# Patient Record
Sex: FEMALE | Race: WHITE | Employment: UNEMPLOYED | URBAN - METROPOLITAN AREA
[De-identification: names, ages, dates, MRNs, and addresses within clinical notes are randomized per-mention and may not be internally consistent; named-entity substitution may affect disease eponyms.]

---

## 2021-07-15 ENCOUNTER — HOSPITAL ENCOUNTER (EMERGENCY)
Age: 61
Discharge: HOME | End: 2021-07-16
Attending: EMERGENCY MEDICINE
Payer: COMMERCIAL

## 2021-07-15 ENCOUNTER — APPOINTMENT (OUTPATIENT)
Dept: GENERAL RADIOLOGY | Age: 61
End: 2021-07-15
Payer: COMMERCIAL

## 2021-07-15 DIAGNOSIS — R06.02 SHORTNESS OF BREATH: Primary | ICD-10-CM

## 2021-07-15 LAB
ANION GAP SERPL CALCULATED.3IONS-SCNC: 18 MMOL/L (ref 9–17)
BUN BLDV-MCNC: 14 MG/DL (ref 8–23)
BUN/CREAT BLD: ABNORMAL (ref 9–20)
CALCIUM SERPL-MCNC: 8.9 MG/DL (ref 8.6–10.4)
CHLORIDE BLD-SCNC: 103 MMOL/L (ref 98–107)
CO2: 19 MMOL/L (ref 20–31)
CREAT SERPL-MCNC: 0.72 MG/DL (ref 0.5–0.9)
GFR AFRICAN AMERICAN: >60 ML/MIN
GFR NON-AFRICAN AMERICAN: >60 ML/MIN
GFR SERPL CREATININE-BSD FRML MDRD: ABNORMAL ML/MIN/{1.73_M2}
GFR SERPL CREATININE-BSD FRML MDRD: ABNORMAL ML/MIN/{1.73_M2}
GLUCOSE BLD-MCNC: 139 MG/DL (ref 70–99)
POTASSIUM SERPL-SCNC: 3.7 MMOL/L (ref 3.7–5.3)
SODIUM BLD-SCNC: 140 MMOL/L (ref 135–144)

## 2021-07-15 PROCEDURE — 84443 ASSAY THYROID STIM HORMONE: CPT

## 2021-07-15 PROCEDURE — 85025 COMPLETE CBC W/AUTO DIFF WBC: CPT

## 2021-07-15 PROCEDURE — 80048 BASIC METABOLIC PNL TOTAL CA: CPT

## 2021-07-15 PROCEDURE — 93005 ELECTROCARDIOGRAM TRACING: CPT | Performed by: STUDENT IN AN ORGANIZED HEALTH CARE EDUCATION/TRAINING PROGRAM

## 2021-07-15 PROCEDURE — 84484 ASSAY OF TROPONIN QUANT: CPT

## 2021-07-15 PROCEDURE — 99285 EMERGENCY DEPT VISIT HI MDM: CPT

## 2021-07-15 PROCEDURE — 71046 X-RAY EXAM CHEST 2 VIEWS: CPT

## 2021-07-16 VITALS
OXYGEN SATURATION: 95 % | TEMPERATURE: 97.7 F | WEIGHT: 145 LBS | RESPIRATION RATE: 16 BRPM | HEART RATE: 88 BPM | SYSTOLIC BLOOD PRESSURE: 106 MMHG | BODY MASS INDEX: 26.52 KG/M2 | DIASTOLIC BLOOD PRESSURE: 71 MMHG

## 2021-07-16 VITALS
OXYGEN SATURATION: 98 % | HEART RATE: 88 BPM | SYSTOLIC BLOOD PRESSURE: 129 MMHG | DIASTOLIC BLOOD PRESSURE: 88 MMHG | TEMPERATURE: 98.2 F | RESPIRATION RATE: 16 BRPM

## 2021-07-16 DIAGNOSIS — S61.219A LACERATION OF FINGER, FOREIGN BODY PRESENCE UNSPECIFIED, NAIL DAMAGE STATUS UNSPECIFIED, UNSPECIFIED FINGER, UNSPECIFIED LATERALITY, INITIAL ENCOUNTER: Primary | ICD-10-CM

## 2021-07-16 LAB
ABSOLUTE EOS #: 0.49 K/UL (ref 0–0.44)
ABSOLUTE IMMATURE GRANULOCYTE: 0 K/UL (ref 0–0.3)
ABSOLUTE LYMPH #: 4.12 K/UL (ref 1.1–3.7)
ABSOLUTE MONO #: 0.49 K/UL (ref 0.1–1.2)
BASOPHILS # BLD: 0 % (ref 0–2)
BASOPHILS ABSOLUTE: 0 K/UL (ref 0–0.2)
DIFFERENTIAL TYPE: ABNORMAL
EOSINOPHILS RELATIVE PERCENT: 5 % (ref 1–4)
HCT VFR BLD CALC: 39.3 % (ref 36.3–47.1)
HEMOGLOBIN: 12.7 G/DL (ref 11.9–15.1)
IMMATURE GRANULOCYTES: 0 %
LYMPHOCYTES # BLD: 42 % (ref 24–43)
MCH RBC QN AUTO: 28.5 PG (ref 25.2–33.5)
MCHC RBC AUTO-ENTMCNC: 32.3 G/DL (ref 28.4–34.8)
MCV RBC AUTO: 88.3 FL (ref 82.6–102.9)
MONOCYTES # BLD: 5 % (ref 3–12)
MORPHOLOGY: NORMAL
NRBC AUTOMATED: 0 PER 100 WBC
PDW BLD-RTO: 14.1 % (ref 11.8–14.4)
PLATELET # BLD: 265 K/UL (ref 138–453)
PLATELET ESTIMATE: ABNORMAL
PMV BLD AUTO: 9.5 FL (ref 8.1–13.5)
RBC # BLD: 4.45 M/UL (ref 3.95–5.11)
RBC # BLD: ABNORMAL 10*6/UL
SEG NEUTROPHILS: 48 % (ref 36–65)
SEGMENTED NEUTROPHILS ABSOLUTE COUNT: 4.7 K/UL (ref 1.5–8.1)
TROPONIN INTERP: NORMAL
TROPONIN INTERP: NORMAL
TROPONIN T: NORMAL NG/ML
TROPONIN T: NORMAL NG/ML
TROPONIN, HIGH SENSITIVITY: <6 NG/L (ref 0–14)
TROPONIN, HIGH SENSITIVITY: <6 NG/L (ref 0–14)
TSH SERPL DL<=0.05 MIU/L-ACNC: 1.35 MIU/L (ref 0.3–5)
WBC # BLD: 9.8 K/UL (ref 3.5–11.3)
WBC # BLD: ABNORMAL 10*3/UL

## 2021-07-16 PROCEDURE — 12001 RPR S/N/AX/GEN/TRNK 2.5CM/<: CPT

## 2021-07-16 PROCEDURE — 84484 ASSAY OF TROPONIN QUANT: CPT

## 2021-07-16 PROCEDURE — 99282 EMERGENCY DEPT VISIT SF MDM: CPT

## 2021-07-16 ASSESSMENT — ENCOUNTER SYMPTOMS
RECTAL PAIN: 0
CHEST TIGHTNESS: 0
WHEEZING: 0
STRIDOR: 0
COUGH: 0
DIARRHEA: 0
ABDOMINAL DISTENTION: 0
TROUBLE SWALLOWING: 0
SHORTNESS OF BREATH: 1
VOMITING: 0
ABDOMINAL PAIN: 0
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
## 2021-07-16 NOTE — ED PROVIDER NOTES
[facility]  ED  Emergency Department Encounter  EmergencyMedicine Resident     Pt Herminia Poole  MRN: [id number]  [place] 1960  Date of evaluation: 7/16/21  PCP:  Fransico Oglesby    This patient was evaluated in the Emergency Department for symptoms described in the history of present illness. The patient was evaluated in the context of the global COVID-19 pandemic, which necessitated consideration that the patient might be at risk for infection with the SARS-CoV-2 virus that causes COVID-19. Institutional protocols and algorithms that pertain to the evaluation of patients at risk for COVID-19 are in a state of rapid change based on information released by regulatory bodies including the CDC and federal and state organizations. These policies and algorithms were followed during the patient's care in the ED. CHIEF COMPLAINT       Chief Complaint   Patient presents with    Panic Attack    Headache       HISTORY OF PRESENT ILLNESS  (Location/Symptom, Timing/Onset, Context/Setting, Quality, Duration, Modifying Factors, Severity.)      Genet Maria is a 64 y.o. female who presents with shortness of breath. Patient states that she was walking up and down the stairs and felt the shortness of breath come on gradually. Patient states that she wanted to: 1.4 whatever reason her  and wanted to. Patient states that she fell asleep and woke up and continued to have the shortness of breath symptoms, no associated chest pain dizziness drowsiness vomiting or other constitutional symptoms. Of note patient is a cigarette smoker who smokes \"many packs a day\". Upon evaluation today patient did not have acute chest pain, she was afebrile and did not have any other constitutional symptoms. Patient states that she was also feeling little better than when she did at home. PAST MEDICAL / SURGICAL / SOCIAL / FAMILY HISTORY      has no past medical history on file.   Patient states that she has a history of hypothyroidism. has a past surgical history that includes Tubal ligation. Social History     Socioeconomic History    Marital status:      Spouse name: Not on file    Number of children: Not on file    Years of education: Not on file    Highest education level: Not on file   Occupational History    Not on file   Tobacco Use    Smoking status: Current Every Day Smoker     Packs/day: 0.25     Years: 41.00     Pack years: 10.25     Types: Cigarettes    Smokeless tobacco: Never Used   Substance and Sexual Activity    Alcohol use: No     Comment: previous heavy ETOH use, stopped around Christmas 2012    Drug use: No    Sexual activity: Not on file   Other Topics Concern    Not on file   Social History Narrative    Not on file     Social Determinants of Health     Financial Resource Strain:     Difficulty of Paying Living Expenses:    Food Insecurity:     Worried About [address] in the Last Year:     [address] [employer] in the Last Year:    Transportation Needs:     Lack of Transportation (Medical):  Lack of Transportation (Non-Medical):    Physical Activity:     Days of Exercise per Week:     Minutes of Exercise per Session:    Stress:     Feeling of Stress :    Social Connections:     Frequency of Communication with Friends and Family:     Frequency of Social Gatherings with Friends and Family:     Attends Orthodoxy Services:     Active Member of Clubs or Organizations:     Attends Club or Organization Meetings:     Marital Status:    Intimate Partner Violence:     Fear of Current or Ex-Partner:     Emotionally Abused:     Physically Abused:     Sexually Abused:        History reviewed. No pertinent family history. Allergies:  Patient has no known allergies. Home Medications:  Prior to Admission medications    Medication Sig Start Date End Date Taking?  Authorizing Provider   HYDROcodone-acetaminophen (NORCO) 5-325 MG per tablet Take 1 tablet by mouth every 6 hours as needed for Pain    Historical Provider, MD   gabapentin (NEURONTIN) 100 MG capsule Take 1 capsule by mouth 3 times daily 5/10/15   Dee Dee Kaplan MD   guaiFENesin (MUCINEX) 600 MG SR tablet Take 1 tablet by mouth 2 times daily. 11/28/14   Philippa Holstein, MD       REVIEW OF SYSTEMS    (2-9 systems for level 4, 10 or more for level 5)      Review of Systems   Constitutional: Negative for chills, diaphoresis, fatigue and fever. HENT: Negative for trouble swallowing. Eyes: Negative for visual disturbance. Respiratory: Positive for shortness of breath. Negative for cough, chest tightness, wheezing and stridor. Cardiovascular: Negative for chest pain and leg swelling. Gastrointestinal: Negative for abdominal distention, abdominal pain, diarrhea, rectal pain and vomiting. Genitourinary: Negative for difficulty urinating, vaginal discharge and vaginal pain. Musculoskeletal: Negative for arthralgias. Neurological: Positive for headaches. Negative for dizziness, facial asymmetry, light-headedness and numbness. PHYSICAL EXAM   (up to 7 for level 4, 8 or more for level 5)      INITIAL VITALS:   /71   Pulse 88   Temp 97.7 °F (36.5 °C)   Resp 16   Wt 145 lb (65.8 kg)   SpO2 95%   BMI 26.52 kg/m²     Physical Exam  Constitutional:       Appearance: Normal appearance. HENT:      Head: Normocephalic and atraumatic. Nose: Nose normal.      Mouth/Throat:      Mouth: Mucous membranes are moist.   Eyes:      Extraocular Movements: Extraocular movements intact. Conjunctiva/sclera: Conjunctivae normal.      Pupils: Pupils are equal, round, and reactive to light. Cardiovascular:      Rate and Rhythm: Regular rhythm. Pulses: Normal pulses. Heart sounds: Normal heart sounds. Pulmonary:      Effort: Pulmonary effort is normal.      Breath sounds: Normal breath sounds. Abdominal:      General: Abdomen is flat. Skin:     General: Skin is warm. Neurological:      General: No focal deficit present. Mental Status: She is alert. DIFFERENTIAL  DIAGNOSIS     PLAN (LABS / IMAGING / EKG):  Orders Placed This Encounter   Procedures    XR CHEST (2 VW)    CBC Auto Differential    Basic Metabolic Panel w/ Reflex to MG    Troponin    TSH with Reflex    EKG 12 Lead       MEDICATIONS ORDERED:  No orders of the defined types were placed in this encounter.       DDX: Hypothyroidism, MI, PE, pneumothorax, dissection    DIAGNOSTIC RESULTS / EMERGENCY DEPARTMENT COURSE / MDM   LAB RESULTS:  Results for orders placed or performed during the hospital encounter of 07/15/21   CBC Auto Differential   Result Value Ref Range    WBC 9.8 3.5 - 11.3 k/uL    RBC 4.45 3.95 - 5.11 m/uL    Hemoglobin 12.7 11.9 - 15.1 g/dL    Hematocrit 39.3 36.3 - 47.1 %    MCV 88.3 82.6 - 102.9 fL    MCH 28.5 25.2 - 33.5 pg    MCHC 32.3 28.4 - 34.8 g/dL    RDW 14.1 11.8 - 14.4 %    Platelets 445 123 - 324 k/uL    MPV 9.5 8.1 - 13.5 fL    NRBC Automated 0.0 0.0 per 100 WBC    Differential Type NOT REPORTED     WBC Morphology NOT REPORTED     RBC Morphology NOT REPORTED     Platelet Estimate NOT REPORTED     Immature Granulocytes 0 0 %    Seg Neutrophils 48 36 - 65 %    Lymphocytes 42 24 - 43 %    Monocytes 5 3 - 12 %    Eosinophils % 5 (H) 1 - 4 %    Basophils 0 0 - 2 %    Absolute Immature Granulocyte 0.00 0.00 - 0.30 k/uL    Segs Absolute 4.70 1.50 - 8.10 k/uL    Absolute Lymph # 4.12 (H) 1.10 - 3.70 k/uL    Absolute Mono # 0.49 0.10 - 1.20 k/uL    Absolute Eos # 0.49 (H) 0.00 - 0.44 k/uL    Basophils Absolute 0.00 0.00 - 0.20 k/uL    Morphology Normal    Basic Metabolic Panel w/ Reflex to MG   Result Value Ref Range    Glucose 139 (H) 70 - 99 mg/dL    BUN 14 8 - 23 mg/dL    CREATININE 0.72 0.50 - 0.90 mg/dL    Bun/Cre Ratio NOT REPORTED 9 - 20    Calcium 8.9 8.6 - 10.4 mg/dL    Sodium 140 135 - 144 mmol/L    Potassium 3.7 3.7 - 5.3 mmol/L    Chloride 103 98 - 107 mmol/L    CO2 19 (L) 20 - 31 mmol/L    Anion Gap 18 (H) 9 - 17 mmol/L    GFR Non-African American >60 >60 mL/min    GFR African American >60 >60 mL/min    GFR Comment          GFR Staging NOT REPORTED    Troponin   Result Value Ref Range    Troponin, High Sensitivity <6 0 - 14 ng/L    Troponin T NOT REPORTED <0.03 ng/mL    Troponin Interp NOT REPORTED    Troponin   Result Value Ref Range    Troponin, High Sensitivity <6 0 - 14 ng/L    Troponin T NOT REPORTED <0.03 ng/mL    Troponin Interp NOT REPORTED    TSH with Reflex   Result Value Ref Range    TSH 1.35 0.30 - 5.00 mIU/L       IMPRESSION: 80-year-old female with history of high both thyroidism presenting with acute shortness of breath. Patient was stable satting well on room air did not require oxygen and was ambulating with a saturation at 97%. Full work-up was negative for acute abnormalities. Patient was given return precautions and discharged home. RADIOLOGY:  XR CHEST (2 VW)    Result Date: 7/16/2021  No acute cardiopulmonary abnormality. EKG  EKG Interpretation    Interpreted by emergency department physician    Rhythm: normal sinus   Rate: normal  Axis: normal  Ectopy: none and premature ventricular contractions (unifocal)  Conduction: normal  ST Segments: normal  T Waves: no acute change  Q Waves: none    Clinical Impression: no acute changes    Lexi Thomason DO      All EKG's are interpreted by the Emergency Department Physician who either signs or Co-signs this chart in the absence of a cardiologist.      FINAL IMPRESSION      1.  Shortness of breath          DISPOSITION / PLAN     DISPOSITION discharged      PATIENT REFERRED TO:  David Youngblood [address] [id number]  [phone]    Schedule an appointment as soon as possible for a visit in 1 week  As needed, If symptoms worsen      DISCHARGE MEDICATIONS:  Discharge Medication List as of 7/16/2021  3:14 AM          Lexi Thomason DO  Emergency Medicine Resident    (Please note that portions of thisnote were completed with a voice recognition program.  Efforts were made to edit the dictations but occasionally words are mis-transcribed.)        Jazmyn Lindquist DO  Resident  07/16/21 1355

## 2021-07-16 NOTE — ED NOTES
Bed: 04  Expected date: 7/15/21  Expected time: 11:01 PM  Means of arrival:   Comments:   [id number] [facility]  07/15/21 8760

## 2021-07-16 NOTE — ED NOTES
Spoke with lab who stated Trop results got overlooked and she will run and result that now.       Maria Luisa Fajardo RN  07/16/21 7164

## 2021-07-16 NOTE — ED PROVIDER NOTES
10.25     Types: Cigarettes    Smokeless tobacco: Never Used   Substance and Sexual Activity    Alcohol use: No     Comment: previous heavy ETOH use, stopped around Christmas 2012    Drug use: No    Sexual activity: Not on file   Other Topics Concern    Not on file   Social History Narrative    Not on file     Social Determinants of Health     Financial Resource Strain:     Difficulty of Paying Living Expenses:    Food Insecurity:     Worried About Running Out of Food in the Last Year:     [address] in the Last Year:    Transportation Needs:     Lack of Transportation (Medical):  Lack of Transportation (Non-Medical):    Physical Activity:     Days of Exercise per Week:     Minutes of Exercise per Session:    Stress:     Feeling of Stress :    Social Connections:     Frequency of Communication with Friends and Family:     Frequency of Social Gatherings with Friends and Family:     Attends Caodaism Services:     Active Member of Clubs or Organizations:     Attends Club or Organization Meetings:     Marital Status:    Intimate Partner Violence:     Fear of Current or Ex-Partner:     Emotionally Abused:     Physically Abused:     Sexually Abused:        History reviewed. No pertinent family history. Allergies:  Patient has no known allergies. Home Medications:  Prior to Admission medications    Medication Sig Start Date End Date Taking? Authorizing Provider   HYDROcodone-acetaminophen (NORCO) 5-325 MG per tablet Take 1 tablet by mouth every 6 hours as needed for Pain    Historical Provider, MD   gabapentin (NEURONTIN) 100 MG capsule Take 1 capsule by mouth 3 times daily 5/10/15   Katie Kaplan MD   guaiFENesin (MUCINEX) 600 MG SR tablet Take 1 tablet by mouth 2 times daily. 11/28/14   Myrna Nair MD       REVIEW OF SYSTEMS    (2-9 systems for level 4, 10 or more for level 5)      Review of Systems   Skin: Positive for wound.        PHYSICAL EXAM   (up to 7 for level 4, 8 or more for level 5)      INITIAL VITALS:   /88   Pulse 88   Temp 98.2 °F (36.8 °C) (Oral)   Resp 16   SpO2 98%     Physical Exam  Skin:     General: Skin is warm. Capillary Refill: Capillary refill takes less than 2 seconds. Findings: Erythema, lesion and wound present. No bruising. Comments: Multiple superficial lacerations present, not exposing subcutaneous tissue. 2 cm cut to the dorsal aspect of her right hand immediately proximal to her second digit. Additional small abrasions present between the MCP and PIP of the third and fourth digits. No obvious bony deformities, no point tenderness, range of motion is unimpaired, able to actively range all digits in flexion and extension. Cap refill less than 2 seconds radial pulse adequate present. DIFFERENTIAL  DIAGNOSIS     PLAN (LABS / IMAGING / EKG):  No orders of the defined types were placed in this encounter. MEDICATIONS ORDERED:  No orders of the defined types were placed in this encounter. DDX: Laceration, mild    DIAGNOSTIC RESULTS / EMERGENCY DEPARTMENT COURSE / MDM   LAB RESULTS:  No results found for this visit on 07/16/21. IMPRESSION: Patient sustained minor cuts to her hand. Not actively bleeding. Band aid was applied to cuts after being cleaned with normal saline and alcohol swabs. Complications. Lac Repair    Date/Time: 7/16/2021 6:10 AM  Performed by: Heath Thompson DO  Authorized by: Lyle Celaya MD     Consent:     Consent obtained:  Verbal    Consent given by:  Patient    Risks discussed:  Infection and pain    Alternatives discussed:  No treatment  Anesthesia (see MAR for exact dosages):      Anesthesia method:  None  Laceration details:     Location:  Hand    Hand location:  R hand, dorsum  Repair type:     Repair type:  Simple  Exploration:     Hemostasis achieved with:  Direct pressure    Wound extent: no muscle damage noted and no vascular damage noted      Contaminated: no Treatment:     Area cleansed with:  Soap and water    Amount of cleaning:  Standard    Irrigation solution:  Sterile saline  Skin repair:     Repair method:  Tissue adhesive  Approximation:     Approximation:  Loose  Post-procedure details:     Dressing:  Adhesive bandage    Patient tolerance of procedure: Tolerated well, no immediate complications  Comments:      Dermabond and Band-Aid was applied to the wound, no issues              FINAL IMPRESSION      1.  Laceration of finger, foreign body presence unspecified, nail damage status unspecified, unspecified finger, unspecified laterality, initial encounter          DISPOSITION / PLAN     DISPOSITION Decision To Discharge 07/16/2021 04:31:45 AM      PATIENT REFERRED TO:  [address]  [phone]    Schedule an appointment as soon as possible for a visit today  For wound re-check      DISCHARGE MEDICATIONS:  Discharge Medication List as of 7/16/2021  4:38 AM          Gustavo Cuevas DO  Emergency Medicine Resident    (Please note that portions of thisnote were completed with a voice recognition program.  Efforts were made to edit the dictations but occasionally words are mis-transcribed.)        Diana Howe DO  Resident  07/16/21 Morena Rowland,   Resident  07/21/21 0005

## 2021-07-17 LAB
EKG ATRIAL RATE: 93 BPM
EKG P AXIS: 63 DEGREES
EKG P-R INTERVAL: 160 MS
EKG Q-T INTERVAL: 360 MS
EKG QRS DURATION: 82 MS
EKG QTC CALCULATION (BAZETT): 447 MS
EKG R AXIS: 65 DEGREES
EKG T AXIS: 38 DEGREES
EKG VENTRICULAR RATE: 93 BPM

## 2021-07-21 NOTE — ED PROVIDER NOTES
Critical Care: None     Liz Walker MD  Attending Emergency Physician         Liz Walker MD  07/20/21 3042

## 2021-07-21 NOTE — ED PROVIDER NOTES
[address] [facility]   Emergency Department  Faculty Attestation       I performed a history and physical examination of the patient and discussed management with the resident. I reviewed the residents note and agree with the documented findings including all diagnostic interpretations and plan of care. Any areas of disagreement are noted on the chart. I was personally present for the key portions of any procedures. I have documented in the chart those procedures where I was not present during the key portions. I have reviewed the emergency nurses triage note. I agree with the chief complaint, past medical history, past surgical history, allergies, medications, social and family history as documented unless otherwise noted below. Documentation of the HPI, Physical Exam and Medical Decision Making performed by [email or web address] is based on my personal performance of the HPI, PE and MDM. For Physician Assistant/ Nurse Practitioner cases/documentation I have personally evaluated this patient and have completed at least one if not all key elements of the E/M (history, physical exam, and MDM). Additional findings are as noted. Pertinent Comments     Primary Care Physician: Carlee Malave      ED Triage Vitals [07/15/21 2314]   BP Temp Temp src Pulse Resp SpO2 Height Weight   130/72 97.7 °F (36.5 °C) -- 96 18 90 % -- 145 lb (65.8 kg)        History/Physical: This is a 64 y.o. female who presents to the Emergency Department with complaint of shortness of breath. Patient states that she woke up from sleep and became short of breath as she was going down the stairs. States that her  try to get her to take his albuterol treatment, but she did not want to take someone else's medications. And he was initially not calling 911 for her. She did not have any shortness of breath worth. Does report that she is been smoking for over 20 years. No history of COPD her knowledge. No fevers. No cough.   On exam, she appears mildly anxious but otherwise well-appearing. Normocephalic atraumatic with poor dentition. Heart sounds are regular no murmurs or gallops. Lungs with some faint scattered wheezing and some decreased air entry throughout. But talking in full sentences without difficulty. No significant extremity edema. Able to ambulate with a steady gait. Alert and oriented x4    MDM/Plan:   Shortness of breath, unclear etiology but is a chronic smoker and likely COPD related. No history of cardiac disease per patient. Is in no significant respiratory distress at this time  Will do cardiac work-up. Patient's work-up was unremarkable, she is able to ambulate around the emergency department and maintain her oxygen saturation without difficulty. Therefore decision was made to discharge at this time.   Recommend following up with primary care for lung function testing as she Does have undiagnosed COPD    EKG Interpretation    Interpreted by emergency department physician    Clinical Impression: NSR with no significant ST changes     Micah Pickett MD      Critical Care: None     Micah Pickett MD  Attending Emergency Physician        Micah Pickett MD  07/20/21 4128

## 2023-12-27 ENCOUNTER — HOSPITAL ENCOUNTER (EMERGENCY)
Age: 63
Discharge: HOME | End: 2023-12-28
Attending: EMERGENCY MEDICINE
Payer: COMMERCIAL

## 2023-12-27 ENCOUNTER — APPOINTMENT (OUTPATIENT)
Dept: GENERAL RADIOLOGY | Age: 63
End: 2023-12-27
Payer: COMMERCIAL

## 2023-12-27 VITALS
SYSTOLIC BLOOD PRESSURE: 131 MMHG | HEIGHT: 61 IN | OXYGEN SATURATION: 98 % | HEART RATE: 79 BPM | BODY MASS INDEX: 31.51 KG/M2 | DIASTOLIC BLOOD PRESSURE: 94 MMHG | TEMPERATURE: 97.6 F | RESPIRATION RATE: 16 BRPM | WEIGHT: 166.89 LBS

## 2023-12-27 DIAGNOSIS — M79.89 SWELLING OF LEFT HAND: Primary | ICD-10-CM

## 2023-12-27 PROCEDURE — 99283 EMERGENCY DEPT VISIT LOW MDM: CPT

## 2023-12-27 PROCEDURE — 73130 X-RAY EXAM OF HAND: CPT

## 2023-12-27 ASSESSMENT — PAIN - FUNCTIONAL ASSESSMENT: PAIN_FUNCTIONAL_ASSESSMENT: NONE - DENIES PAIN

## 2023-12-28 NOTE — ED PROVIDER NOTES
[facility] ED  Emergency Department Encounter  Emergency Medicine Resident     Pt Estrella Colon  MRN: [id number]  [address] [facility] 1960  Date of evaluation: 12/27/23  PCP:  Liset Mishra  Note Started: 11:16 PM EST      CHIEF COMPLAINT       Chief Complaint   Patient presents with    Joint Swelling     Bilateral hands       HISTORY OF PRESENT ILLNESS  (Location/Symptom, Timing/Onset, Context/Setting, Quality, Duration, Modifying Factors, Severity.)      Jimmie Abarca is a 61 y.o. female who presents with swelling of bilateral hands worse on the left than right onset approximately 10 PM.  Patient states she is experience this intermittently over the past year. Patient states she discontinued follow-up with her PCP and subsequently ran out of her medications without refill approximately 1 year ago. This includes her maintenance thyroid medication. She denies fever, chills, shortness of breath, chest pain, cough, nausea, vomiting, abdominal pain, constipation, blood in urine or stool. She does note diarrhea intermittently over the past year. PAST MEDICAL / SURGICAL / SOCIAL / FAMILY HISTORY      has no past medical history on file. has a past surgical history that includes Tubal ligation.       Social History     Socioeconomic History    Marital status:      Spouse name: Not on file    Number of children: Not on file    Years of education: Not on file    Highest education level: Not on file   Occupational History    Not on file   Tobacco Use    Smoking status: Every Day     Current packs/day: 0.25     Average packs/day: 0.3 packs/day for 41.0 years (10.3 ttl pk-yrs)     Types: Cigarettes    Smokeless tobacco: Never   Substance and Sexual Activity    Alcohol use: No     Comment: previous heavy ETOH use, stopped around Christmas 2012    Drug use: No    Sexual activity: Not on file   Other Topics Concern    Not on file   Social History Narrative    Not on file     Social

## 2023-12-28 NOTE — ED NOTES
Patient presents to ED via triage with complaints of bilateral hand swelling that started about 45 minutes ago. Patient states she has tingling in the left hand. Patient denies any injury to the hands. Patient denies hx of arthritis. Patient is A&O x4 and call light within reach.

## 2023-12-28 NOTE — DISCHARGE INSTRUCTIONS
Take your medication as indicated and prescribed. For pain use ibuprofen (Motrin / Advil) or acetaminophen (Tylenol), unless prescribed medications that have acetaminophen in it. You can take over the counter acetaminophen tablets (1 - 2 tablets of the 500-mg strength every 6 hours) or ibuprofen tablets (2 tablets every 4 hours). Keep your hand elevated above your heart as much as possible to help reduce swelling. You may also use ice or compression to achieve this goal.  Be sure to rest the area when symptoms arise to avoid worsening. PLEASE RETURN TO THE EMERGENCY DEPARTMENT IMMEDIATELY for worsening symptoms, pain not controlled with the prescribed / over the counter pain medication, numbness or tingling in your hands, unable to lift your wrist up, or if you develop any concerning symptoms such as: high fever not relieved by acetaminophen (Tylenol) and/or ibuprofen (Motrin / Advil), chills, shortness of breath, chest pain, feeling of your heart fluttering or racing, persistent nausea and/or vomiting, vomiting up blood, blood in your stool, numbness, loss of consciousness, weakness or tingling in the arms or legs or change in color of the extremities, changes in mental status, persistent headache, blurry vision, loss of bladder / bowel control, unable to follow up with your physician, or other any other care or concern.

## 2024-11-03 ENCOUNTER — TELEPHONE (OUTPATIENT)
Dept: FAMILY MEDICINE CLINIC | Age: 64
End: 2024-11-03
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
## 2024-11-10 ENCOUNTER — APPOINTMENT (OUTPATIENT)
Dept: GENERAL RADIOLOGY | Age: 64
End: 2024-11-10
Payer: COMMERCIAL

## 2024-11-10 ENCOUNTER — APPOINTMENT (OUTPATIENT)
Dept: CT IMAGING | Age: 64
End: 2024-11-10
Payer: COMMERCIAL

## 2024-11-10 ENCOUNTER — HOSPITAL ENCOUNTER (EMERGENCY)
Age: 64
Discharge: LEFT BEFORE DISCHARGE | End: 2024-11-11
Attending: EMERGENCY MEDICINE
Payer: COMMERCIAL

## 2024-11-10 VITALS
HEART RATE: 53 BPM | SYSTOLIC BLOOD PRESSURE: 122 MMHG | TEMPERATURE: 97.8 F | RESPIRATION RATE: 12 BRPM | OXYGEN SATURATION: 96 % | DIASTOLIC BLOOD PRESSURE: 81 MMHG

## 2024-11-10 DIAGNOSIS — E03.9 HYPOTHYROIDISM, UNSPECIFIED TYPE: Primary | ICD-10-CM

## 2024-11-10 DIAGNOSIS — Z53.29 LEFT AGAINST MEDICAL ADVICE: ICD-10-CM

## 2024-11-10 DIAGNOSIS — E87.1 HYPONATREMIA: ICD-10-CM

## 2024-11-10 DIAGNOSIS — N30.00 ACUTE CYSTITIS WITHOUT HEMATURIA: ICD-10-CM

## 2024-11-10 LAB
ALBUMIN SERPL-MCNC: 4.3 G/DL (ref 3.5–5.2)
ALBUMIN/GLOB SERPL: 1.3 {RATIO} (ref 1–2.5)
ALP SERPL-CCNC: 53 U/L (ref 35–104)
ALT SERPL-CCNC: 19 U/L (ref 10–35)
AMPHET UR QL SCN: NEGATIVE
ANION GAP SERPL CALCULATED.3IONS-SCNC: 13 MMOL/L (ref 9–16)
APAP SERPL-MCNC: <5 UG/ML (ref 10–30)
AST SERPL-CCNC: 66 U/L (ref 10–35)
BACTERIA URNS QL MICRO: ABNORMAL
BARBITURATES UR QL SCN: NEGATIVE
BASOPHILS # BLD: 0.11 K/UL (ref 0–0.2)
BASOPHILS NFR BLD: 2 % (ref 0–2)
BENZODIAZ UR QL: NEGATIVE
BILIRUB SERPL-MCNC: 0.3 MG/DL (ref 0–1.2)
BILIRUB UR QL STRIP: NEGATIVE
BUN SERPL-MCNC: 6 MG/DL (ref 8–23)
CALCIUM SERPL-MCNC: 9.3 MG/DL (ref 8.6–10.4)
CANNABINOIDS UR QL SCN: NEGATIVE
CASTS #/AREA URNS LPF: ABNORMAL /LPF (ref 0–8)
CHLORIDE SERPL-SCNC: 95 MMOL/L (ref 98–107)
CLARITY UR: ABNORMAL
CO2 SERPL-SCNC: 23 MMOL/L (ref 20–31)
COCAINE UR QL SCN: NEGATIVE
COLOR UR: YELLOW
CREAT SERPL-MCNC: 1 MG/DL (ref 0.6–0.9)
EOSINOPHIL # BLD: 0.39 K/UL (ref 0–0.4)
EOSINOPHILS RELATIVE PERCENT: 7 % (ref 1–4)
EPI CELLS #/AREA URNS HPF: ABNORMAL /HPF (ref 0–5)
ERYTHROCYTE [DISTWIDTH] IN BLOOD BY AUTOMATED COUNT: 16.4 % (ref 11.8–14.4)
ETHANOL PERCENT: <0.01 %
ETHANOLAMINE SERPL-MCNC: <10 MG/DL (ref 0–0.08)
FENTANYL UR QL: NEGATIVE
GFR, ESTIMATED: 63 ML/MIN/1.73M2
GLUCOSE SERPL-MCNC: 83 MG/DL (ref 74–99)
GLUCOSE UR STRIP-MCNC: NEGATIVE MG/DL
HCT VFR BLD AUTO: 25.2 % (ref 36.3–47.1)
HGB BLD-MCNC: 8.2 G/DL (ref 11.9–15.1)
HGB UR QL STRIP.AUTO: ABNORMAL
IMM GRANULOCYTES # BLD AUTO: 0 K/UL (ref 0–0.3)
IMM GRANULOCYTES NFR BLD: 0 %
KETONES UR STRIP-MCNC: NEGATIVE MG/DL
LEUKOCYTE ESTERASE UR QL STRIP: ABNORMAL
LYMPHOCYTES NFR BLD: 2.2 K/UL (ref 1–4.8)
LYMPHOCYTES RELATIVE PERCENT: 40 % (ref 24–44)
MCH RBC QN AUTO: 27.4 PG (ref 25.2–33.5)
MCHC RBC AUTO-ENTMCNC: 32.5 G/DL (ref 28.4–34.8)
MCV RBC AUTO: 84.3 FL (ref 82.6–102.9)
METHADONE UR QL: NEGATIVE
MONOCYTES NFR BLD: 0.33 K/UL (ref 0.1–0.8)
MONOCYTES NFR BLD: 6 % (ref 1–7)
MORPHOLOGY: ABNORMAL
NEUTROPHILS NFR BLD: 45 % (ref 36–66)
NEUTS SEG NFR BLD: 2.47 K/UL (ref 1.8–7.7)
NITRITE UR QL STRIP: POSITIVE
NRBC BLD-RTO: 0 PER 100 WBC
OPIATES UR QL SCN: NEGATIVE
OXYCODONE UR QL SCN: NEGATIVE
PCP UR QL SCN: NEGATIVE
PH UR STRIP: 6 [PH] (ref 5–8)
PLATELET # BLD AUTO: ABNORMAL K/UL (ref 138–453)
PLATELET, FLUORESCENCE: ABNORMAL K/UL (ref 138–453)
PLATELETS.RETICULATED NFR BLD AUTO: 4.4 % (ref 1.1–10.3)
POTASSIUM SERPL-SCNC: 3.9 MMOL/L (ref 3.7–5.3)
PROT SERPL-MCNC: 7.5 G/DL (ref 6.6–8.7)
PROT UR STRIP-MCNC: NEGATIVE MG/DL
RBC # BLD AUTO: 2.99 M/UL (ref 3.95–5.11)
RBC #/AREA URNS HPF: ABNORMAL /HPF (ref 0–4)
SALICYLATES SERPL-MCNC: <0.5 MG/DL (ref 0–10)
SODIUM SERPL-SCNC: 131 MMOL/L (ref 136–145)
SP GR UR STRIP: 1.01 (ref 1–1.03)
T4 FREE SERPL-MCNC: <0.1 NG/DL (ref 0.92–1.68)
TEST INFORMATION: NORMAL
TROPONIN I SERPL HS-MCNC: 40 NG/L (ref 0–14)
TROPONIN I SERPL HS-MCNC: 40 NG/L (ref 0–14)
TSH SERPL DL<=0.05 MIU/L-ACNC: 62.9 UIU/ML (ref 0.27–4.2)
UROBILINOGEN UR STRIP-ACNC: NORMAL EU/DL (ref 0–1)
WBC #/AREA URNS HPF: ABNORMAL /HPF (ref 0–5)
WBC OTHER # BLD: 5.5 K/UL (ref 3.5–11.3)

## 2024-11-10 PROCEDURE — G0480 DRUG TEST DEF 1-7 CLASSES: HCPCS

## 2024-11-10 PROCEDURE — 80179 DRUG ASSAY SALICYLATE: CPT

## 2024-11-10 PROCEDURE — 84439 ASSAY OF FREE THYROXINE: CPT

## 2024-11-10 PROCEDURE — 71045 X-RAY EXAM CHEST 1 VIEW: CPT

## 2024-11-10 PROCEDURE — 87186 SC STD MICRODIL/AGAR DIL: CPT

## 2024-11-10 PROCEDURE — 81001 URINALYSIS AUTO W/SCOPE: CPT

## 2024-11-10 PROCEDURE — 84443 ASSAY THYROID STIM HORMONE: CPT

## 2024-11-10 PROCEDURE — 87077 CULTURE AEROBIC IDENTIFY: CPT

## 2024-11-10 PROCEDURE — 85055 RETICULATED PLATELET ASSAY: CPT

## 2024-11-10 PROCEDURE — 84484 ASSAY OF TROPONIN QUANT: CPT

## 2024-11-10 PROCEDURE — 80307 DRUG TEST PRSMV CHEM ANLYZR: CPT

## 2024-11-10 PROCEDURE — 93005 ELECTROCARDIOGRAM TRACING: CPT | Performed by: EMERGENCY MEDICINE

## 2024-11-10 PROCEDURE — 87086 URINE CULTURE/COLONY COUNT: CPT

## 2024-11-10 PROCEDURE — 70450 CT HEAD/BRAIN W/O DYE: CPT

## 2024-11-10 PROCEDURE — 80053 COMPREHEN METABOLIC PANEL: CPT

## 2024-11-10 PROCEDURE — 99285 EMERGENCY DEPT VISIT HI MDM: CPT

## 2024-11-10 PROCEDURE — 85025 COMPLETE CBC W/AUTO DIFF WBC: CPT

## 2024-11-10 PROCEDURE — 80143 DRUG ASSAY ACETAMINOPHEN: CPT

## 2024-11-10 RX ORDER — SULFAMETHOXAZOLE AND TRIMETHOPRIM 800; 160 MG/1; MG/1
1 TABLET ORAL ONCE
Status: DISCONTINUED | OUTPATIENT
Start: 2024-11-11 | End: 2024-11-11 | Stop reason: HOSPADM

## 2024-11-10 RX ORDER — SULFAMETHOXAZOLE AND TRIMETHOPRIM 800; 160 MG/1; MG/1
1 TABLET ORAL 2 TIMES DAILY
Qty: 10 TABLET | Refills: 0 | Status: SHIPPED | OUTPATIENT
Start: 2024-11-10 | End: 2024-11-12

## 2024-11-10 ASSESSMENT — LIFESTYLE VARIABLES
HOW OFTEN DO YOU HAVE A DRINK CONTAINING ALCOHOL: NEVER
HOW MANY STANDARD DRINKS CONTAINING ALCOHOL DO YOU HAVE ON A TYPICAL DAY: PATIENT DOES NOT DRINK
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
## 2024-11-11 LAB
EKG ATRIAL RATE: 72 BPM
EKG P AXIS: 89 DEGREES
EKG P-R INTERVAL: 194 MS
EKG Q-T INTERVAL: 378 MS
EKG QRS DURATION: 84 MS
EKG QTC CALCULATION (BAZETT): 413 MS
EKG R AXIS: 75 DEGREES
EKG T AXIS: 6 DEGREES
EKG VENTRICULAR RATE: 72 BPM

## 2024-11-11 PROCEDURE — 93010 ELECTROCARDIOGRAM REPORT: CPT | Performed by: INTERNAL MEDICINE

## 2024-11-11 NOTE — DISCHARGE INSTRUCTIONS
You were seen today for dizziness. Your head CT was normal. Your labs work showed elevation in your heart enzymes and you had undetectable T4 thyroid levels. You also have a urinary tract infection. We recommended you be admitted to the hospital however you declined. You decided to leave against medical advice and you vocalized understanding that risks include worsening symptoms, permanent injury and death.     If you notice any concerning symptoms please return to the ER immediately. These can include but are not limited to: fevers, chills, shortness of breath, vomiting, weakness of the extremities, changes in your mental status, numbness, pale extremities, or chest pain.     Wound care: none    Diet: You may resume your normal diet     Activity: resume activity as tolerated.     Medications: Continue taking your home medications as previously directed.     Please take antibiotics as prescribed.     Follow up: Please follow up with your primary care doctor as soon as possible.
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
## 2024-11-12 ENCOUNTER — OFFICE VISIT (OUTPATIENT)
Dept: PRIMARY CARE CLINIC | Age: 64
End: 2024-11-12

## 2024-11-12 VITALS
HEART RATE: 74 BPM | BODY MASS INDEX: 29.07 KG/M2 | WEIGHT: 154 LBS | OXYGEN SATURATION: 98 % | SYSTOLIC BLOOD PRESSURE: 124 MMHG | DIASTOLIC BLOOD PRESSURE: 80 MMHG | HEIGHT: 61 IN

## 2024-11-12 DIAGNOSIS — N30.00 ACUTE CYSTITIS WITHOUT HEMATURIA: ICD-10-CM

## 2024-11-12 DIAGNOSIS — R46.7 VERBOSITY AND CIRCUMSTANTIAL DETAIL OBSCURING REASON FOR CONTACT: ICD-10-CM

## 2024-11-12 DIAGNOSIS — R45.1 PSYCHOMOTOR RESTLESSNESS: ICD-10-CM

## 2024-11-12 DIAGNOSIS — K21.00 GASTROESOPHAGEAL REFLUX DISEASE WITH ESOPHAGITIS WITHOUT HEMORRHAGE: ICD-10-CM

## 2024-11-12 DIAGNOSIS — E03.9 HYPOTHYROIDISM, UNSPECIFIED TYPE: ICD-10-CM

## 2024-11-12 DIAGNOSIS — F32.1 CURRENT MODERATE EPISODE OF MAJOR DEPRESSIVE DISORDER, UNSPECIFIED WHETHER RECURRENT (HCC): Primary | ICD-10-CM

## 2024-11-12 DIAGNOSIS — Z76.89 ESTABLISHING CARE WITH NEW DOCTOR, ENCOUNTER FOR: ICD-10-CM

## 2024-11-12 DIAGNOSIS — Z28.21 FLU VACCINE REFUSED: ICD-10-CM

## 2024-11-12 PROBLEM — R74.8 ELEVATED LIVER ENZYMES: Status: ACTIVE | Noted: 2024-11-12

## 2024-11-12 PROBLEM — D50.9 IRON DEFICIENCY ANEMIA: Status: ACTIVE | Noted: 2024-11-12

## 2024-11-12 PROBLEM — K52.831 COLLAGENOUS COLITIS: Status: ACTIVE | Noted: 2024-11-12

## 2024-11-12 LAB
MICROORGANISM SPEC CULT: ABNORMAL
SPECIMEN DESCRIPTION: ABNORMAL

## 2024-11-12 RX ORDER — SULFAMETHOXAZOLE AND TRIMETHOPRIM 800; 160 MG/1; MG/1
1 TABLET ORAL 2 TIMES DAILY
Qty: 10 TABLET | Refills: 0 | Status: SHIPPED | OUTPATIENT
Start: 2024-11-12 | End: 2024-11-17

## 2024-11-12 RX ORDER — LEVOTHYROXINE SODIUM 100 UG/1
88 TABLET ORAL DAILY
COMMUNITY
End: 2024-11-12

## 2024-11-12 RX ORDER — DIPHENOXYLATE HYDROCHLORIDE AND ATROPINE SULFATE 2.5; .025 MG/1; MG/1
1 TABLET ORAL 4 TIMES DAILY PRN
COMMUNITY

## 2024-11-12 RX ORDER — PANTOPRAZOLE SODIUM 40 MG/1
40 TABLET, DELAYED RELEASE ORAL
Qty: 90 TABLET | Refills: 1 | Status: SHIPPED | OUTPATIENT
Start: 2024-11-12

## 2024-11-12 RX ORDER — LEVOTHYROXINE SODIUM 88 UG/1
88 TABLET ORAL DAILY
Qty: 60 TABLET | Refills: 0 | Status: SHIPPED | OUTPATIENT
Start: 2024-11-12

## 2024-11-12 SDOH — ECONOMIC STABILITY: INCOME INSECURITY: HOW HARD IS IT FOR YOU TO PAY FOR THE VERY BASICS LIKE FOOD, HOUSING, MEDICAL CARE, AND HEATING?: SOMEWHAT HARD

## 2024-11-12 SDOH — ECONOMIC STABILITY: FOOD INSECURITY: WITHIN THE PAST 12 MONTHS, THE FOOD YOU BOUGHT JUST DIDN'T LAST AND YOU DIDN'T HAVE MONEY TO GET MORE.: NEVER TRUE

## 2024-11-12 SDOH — ECONOMIC STABILITY: FOOD INSECURITY: WITHIN THE PAST 12 MONTHS, YOU WORRIED THAT YOUR FOOD WOULD RUN OUT BEFORE YOU GOT MONEY TO BUY MORE.: NEVER TRUE

## 2024-11-12 ASSESSMENT — PATIENT HEALTH QUESTIONNAIRE - PHQ9
4. FEELING TIRED OR HAVING LITTLE ENERGY: MORE THAN HALF THE DAYS
SUM OF ALL RESPONSES TO PHQ QUESTIONS 1-9: 14
SUM OF ALL RESPONSES TO PHQ9 QUESTIONS 1 & 2: 5
8. MOVING OR SPEAKING SO SLOWLY THAT OTHER PEOPLE COULD HAVE NOTICED. OR THE OPPOSITE, BEING SO FIGETY OR RESTLESS THAT YOU HAVE BEEN MOVING AROUND A LOT MORE THAN USUAL: NOT AT ALL
2. FEELING DOWN, DEPRESSED OR HOPELESS: NEARLY EVERY DAY
3. TROUBLE FALLING OR STAYING ASLEEP: NEARLY EVERY DAY
10. IF YOU CHECKED OFF ANY PROBLEMS, HOW DIFFICULT HAVE THESE PROBLEMS MADE IT FOR YOU TO DO YOUR WORK, TAKE CARE OF THINGS AT HOME, OR GET ALONG WITH OTHER PEOPLE: SOMEWHAT DIFFICULT
SUM OF ALL RESPONSES TO PHQ QUESTIONS 1-9: 14
9. THOUGHTS THAT YOU WOULD BE BETTER OFF DEAD, OR OF HURTING YOURSELF: NOT AT ALL
SUM OF ALL RESPONSES TO PHQ QUESTIONS 1-9: 14
1. LITTLE INTEREST OR PLEASURE IN DOING THINGS: MORE THAN HALF THE DAYS
SUM OF ALL RESPONSES TO PHQ QUESTIONS 1-9: 14
7. TROUBLE CONCENTRATING ON THINGS, SUCH AS READING THE NEWSPAPER OR WATCHING TELEVISION: SEVERAL DAYS
5. POOR APPETITE OR OVEREATING: MORE THAN HALF THE DAYS
6. FEELING BAD ABOUT YOURSELF - OR THAT YOU ARE A FAILURE OR HAVE LET YOURSELF OR YOUR FAMILY DOWN: SEVERAL DAYS

## 2024-11-12 NOTE — PATIENT INSTRUCTIONS
I have generated a referral for GI.  If you have not heard from them in 4 business days, you can call the number on the order sheet and schedule an appointment to get established. If you have any issues getting scheduled, please call the office to let me know.

## 2024-11-12 NOTE — PROGRESS NOTES
Component Date Value Ref Range Status    Sodium 11/10/2024 131 (L)  136 - 145 mmol/L Final    Potassium 11/10/2024 3.9  3.7 - 5.3 mmol/L Final    Comment: Specimen hemolysis has exceeded the interference as defined by Roche. Value may be falsely   increased. Suggest recollection if clinically indicated.      Chloride 11/10/2024 95 (L)  98 - 107 mmol/L Final    CO2 11/10/2024 23  20 - 31 mmol/L Final    Anion Gap 11/10/2024 13  9 - 16 mmol/L Final    Glucose 11/10/2024 83  74 - 99 mg/dL Final    BUN 11/10/2024 6 (L)  8 - 23 mg/dL Final    Creatinine 11/10/2024 1.0 (H)  0.6 - 0.9 mg/dL Final    Est, Glom Filt Rate 11/10/2024 63  >60 mL/min/1.73m2 Final    Comment:       These results are not intended for use in patients <18 years of age.        eGFR results are calculated without a race factor using the 2021 CKD-EPI equation.  Careful clinical correlation is recommended, particularly when comparing to results   calculated using previous equations.  The CKD-EPI equation is less accurate in patients with extremes of muscle mass, extra-renal   metabolism of creatine, excessive creatine ingestion, or following therapy that affects   renal tubular secretion.      Calcium 11/10/2024 9.3  8.6 - 10.4 mg/dL Final    Total Protein 11/10/2024 7.5  6.6 - 8.7 g/dL Final    Albumin 11/10/2024 4.3  3.5 - 5.2 g/dL Final    Albumin/Globulin Ratio 11/10/2024 1.3  1.0 - 2.5 Final    Total Bilirubin 11/10/2024 0.3  0.0 - 1.2 mg/dL Final    Alkaline Phosphatase 11/10/2024 53  35 - 104 U/L Final    ALT 11/10/2024 19  10 - 35 U/L Final    AST 11/10/2024 66 (H)  10 - 35 U/L Final    Comment: Specimen hemolysis has exceeded the interference as defined by Roche. Value may be falsely   increased. Suggest recollection if clinically indicated.      WBC 11/10/2024 5.5  3.5 - 11.3 k/uL Final    RBC 11/10/2024 2.99 (L)  3.95 - 5.11 m/uL Final    Hemoglobin 11/10/2024 8.2 (L)  11.9 - 15.1 g/dL Final    Hematocrit 11/10/2024 25.2 (L)  36.3 - 47.1 %

## 2024-11-13 NOTE — PROGRESS NOTES
Reviewed patient's urine culture - culture positive for Klebsiella.  Patient was discharged on Bactrim, and culture is sensitive to prescribed medication.  Antibiotic prescribed at discharge is appropriate - no changes made to antibiotic regimen.     Sharlene Jimenez, PharmD 11/13/2024 3:14 PM

## 2024-12-01 PROBLEM — F32.1 CURRENT MODERATE EPISODE OF MAJOR DEPRESSIVE DISORDER (HCC): Status: ACTIVE | Noted: 2024-12-01

## 2024-12-06 NOTE — ASSESSMENT & PLAN NOTE
-Patient needs refill of levothyroxine  -No recent TSH- however, unable to discuss labwork during this appointment  -will bring up at follow-up appt

## 2024-12-06 NOTE — ASSESSMENT & PLAN NOTE
-PHQ screener positive  -Patient not open to discussing depression dx or management options.      11/12/2024    10:02 AM   PHQ Scores   PHQ2 Score 5   PHQ9 Score 14     Interpretation of Total Score Depression Severity: 1-4 = Minimal depression, 5-9 = Mild depression, 10-14 = Moderate depression, 15-19 = Moderately severe depression, 20-27 = Severe depression

## 2024-12-11 ENCOUNTER — TRANSCRIBE ORDERS (OUTPATIENT)
Dept: GENERAL RADIOLOGY | Age: 64
End: 2024-12-11

## 2024-12-11 ENCOUNTER — TRANSCRIBE ORDERS (OUTPATIENT)
Dept: ADMINISTRATIVE | Age: 64
End: 2024-12-11

## 2024-12-11 DIAGNOSIS — R60.0 LOCALIZED EDEMA: Primary | ICD-10-CM

## 2024-12-12 ENCOUNTER — HOSPITAL ENCOUNTER (OUTPATIENT)
Dept: VASCULAR LAB | Age: 64
Discharge: HOME | End: 2024-12-14
Payer: COMMERCIAL

## 2024-12-12 DIAGNOSIS — R60.0 LOCALIZED EDEMA: ICD-10-CM

## 2024-12-12 PROCEDURE — 93970 EXTREMITY STUDY: CPT | Performed by: SURGERY

## 2024-12-12 PROCEDURE — 93970 EXTREMITY STUDY: CPT

## 2024-12-23 ENCOUNTER — TELEPHONE (OUTPATIENT)
Dept: PRIMARY CARE CLINIC | Age: 64
End: 2024-12-23

## 2025-05-13 DIAGNOSIS — K21.00 GASTROESOPHAGEAL REFLUX DISEASE WITH ESOPHAGITIS WITHOUT HEMORRHAGE: ICD-10-CM

## 2025-05-16 RX ORDER — PANTOPRAZOLE SODIUM 40 MG/1
40 TABLET, DELAYED RELEASE ORAL
Qty: 90 TABLET | Refills: 0 | Status: SHIPPED | OUTPATIENT
Start: 2025-05-16

## 2025-06-10 ENCOUNTER — HOSPITAL ENCOUNTER (OUTPATIENT)
Age: 65
Setting detail: THERAPIES SERIES
Discharge: HOME | End: 2025-06-10
Payer: MEDICARE

## 2025-06-10 PROCEDURE — 97166 OT EVAL MOD COMPLEX 45 MIN: CPT

## 2025-06-10 NOTE — CONSULTS
ankle: Swelling present.      Left ankle: Swelling present.   Skin:     General: Skin is warm and dry.      Findings: Erythema and rash present. Rash is macular.      Comments: Macular erythematous patch to the bilateral lower legs. Does not jyoti, is not warm to touch, but is tender to palpation.    Neurological:      General: No focal deficit present.      Mental Status: She is alert and oriented to person, place, and time.      GCS: GCS eye subscore is 4. GCS verbal subscore is 5. GCS motor subscore is 6.            Procedure:  Procedures     Re-evaluation:  Re-Evaluation     Medical Decision Making        Will rx furosemide for a short course until pt can f/u for longer rx to treat LE swelling. Anticipatory guidance and return to ED precautions given. Pt acknowledges, understands and is agreeable to plan, pt safe for discharge home.     Amount and/or Complexity of Data Reviewed  External Data Reviewed: notes.     Details: Chart Reviewed. Date of Note: 11/10/2024. Type of Note: OSH ED visit. Notable for: Patient seen for dizziness, diagnosed with hyponatremia, value is 131, hypothyroidism, with TSH at 62.9 without reflex, elevated troponin at 40, as well as UTI, discharged on TMP-SMX, offered admission, declined, ultimately AMA, culture growing klebsiella, culture sensitive to prescribed medication, no changes made to regimen.    Labs: ordered.     Details: Thyroid studies collected and pending at time of disposition--pt to follow results on MyChart. CBC, Metabolic panel, Troponin, BNP, and Urinalysis resulting WNL, making systemic infection, FABIO, NSTEMI, CHF, and UTI all less likely, respectively.   Radiology: ordered and independent interpretation performed.     Details: My preliminary interpretation of imaging studies: two-view, chest radiograph, negative for cardiomegaly, obvious lobar opacity or pneumothorax. Radiologist's final report reviewed.     ECG/medicine tests: ordered and independent interpretation

## 2025-06-11 NOTE — PRE-CERTIFICATION NOTE
[x] [facility]  Outpatient Rehabilitation &  Therapy  [address]  [phone]  [phone] [] [facility]  Outpatient Rehabilitation &  Therapy  [address]  P: [phone]  F: [phone] [] [facility]  Outpatient Rehabilitation &  Therapy  [address]  P: [phone]  F: [phone] [] [facility]  Outpatient Rehabilitation &  Therapy  [address]  [phone]  [phone] [] [facility]  Outpatient Rehabilitation &  Therapy  [address]   P: [phone]  F: [phone]  [] [facility]  Outpatient Rehabilitation &  Therapy  [address]  P: [phone]  F: [phone] [] [facility]  Outpatient Rehabilitation &  Therapy  [address]  P: [phone]  F: [phone] [] [facility]  Outpatient Rehabilitation &  Therapy  [address]  P: [phone]  F: [phone] [] [facility]  Outpatient Rehabilitation &  Therapy  [address]  P: [phone]  F: [phone]  [] [facility] Outpatient Rehabilitation &  Therapy  [address]  P: [phone]  F: [phone]          Therapy Pre-certification Note      6/11/2025    Eleni Rockwell  1960   [id number]      Insurance approval was received for Occupational Therapy from [email or web address] [facility] on 6/11/2025.  Approval was received for 15 visits, from 6/18/2025 to 9/30/2025.  Authorization #[id number]  Tracking #[id number]       [x] Primary therapist was notified.  [] Called patient to schedule and was able to schedule patient.  [] Called patient to schedule and message was left.  [] Called patient to schedule but was unable to reach patient.      Electronically signed by Carly Hickman PT on 6/11/2025 at 4:35 AM

## 2025-06-13 ENCOUNTER — TELEPHONE (OUTPATIENT)
Dept: FAMILY MEDICINE CLINIC | Age: 65
End: 2025-06-13

## 2025-06-23 ENCOUNTER — TELEPHONE (OUTPATIENT)
Dept: PRIMARY CARE CLINIC | Age: 65
End: 2025-06-23

## 2025-07-29 ENCOUNTER — TELEPHONE (OUTPATIENT)
Dept: INTERNAL MEDICINE CLINIC | Age: 65
End: 2025-07-29

## 2025-08-20 ENCOUNTER — HOSPITAL ENCOUNTER (OUTPATIENT)
Age: 65
Setting detail: SPECIMEN
Discharge: HOME | End: 2025-08-20
Payer: MEDICARE

## 2025-08-20 PROCEDURE — 87493 C DIFF AMPLIFIED PROBE: CPT

## 2025-08-20 PROCEDURE — 87328 CRYPTOSPORIDIUM AG IA: CPT

## 2025-08-20 PROCEDURE — 83993 ASSAY FOR CALPROTECTIN FECAL: CPT

## 2025-08-20 PROCEDURE — 87506 IADNA-DNA/RNA PROBE TQ 6-11: CPT

## 2025-08-20 PROCEDURE — 87329 GIARDIA AG IA: CPT

## 2025-08-22 LAB
C DIFFICILE TOXINS, PCR: NORMAL
C PARVUM AG STL QL IA: NEGATIVE
CAMPYLOBACTER DNA SPEC NAA+PROBE: NORMAL
ETEC ELTA+ESTB GENES STL QL NAA+PROBE: NORMAL
G LAMBLIA AG STL QL IA: NEGATIVE
P SHIGELLOIDES DNA STL QL NAA+PROBE: NORMAL
SALMONELLA DNA SPEC QL NAA+PROBE: NORMAL
SHIGA TOXIN STX GENE SPEC NAA+PROBE: NORMAL
SHIGELLA DNA SPEC QL NAA+PROBE: NORMAL
SOURCE: NORMAL
SPECIMEN DESCRIPTION: NORMAL
V CHOL+PARA RFBL+TRKH+TNAA STL QL NAA+PR: NORMAL
Y ENTERO RECN STL QL NAA+PROBE: NORMAL

## 2025-08-26 LAB — CALPROTECTIN, FECAL: 161 UG/G
